# Patient Record
Sex: MALE | Race: WHITE | NOT HISPANIC OR LATINO | Employment: FULL TIME | URBAN - NONMETROPOLITAN AREA
[De-identification: names, ages, dates, MRNs, and addresses within clinical notes are randomized per-mention and may not be internally consistent; named-entity substitution may affect disease eponyms.]

---

## 2023-05-18 ENCOUNTER — OFFICE VISIT (OUTPATIENT)
Dept: FAMILY MEDICINE | Facility: CLINIC | Age: 23
End: 2023-05-18
Payer: COMMERCIAL

## 2023-05-18 VITALS
HEART RATE: 112 BPM | SYSTOLIC BLOOD PRESSURE: 111 MMHG | TEMPERATURE: 98 F | OXYGEN SATURATION: 98 % | DIASTOLIC BLOOD PRESSURE: 80 MMHG | WEIGHT: 140 LBS | RESPIRATION RATE: 14 BRPM | BODY MASS INDEX: 19.6 KG/M2 | HEIGHT: 71 IN

## 2023-05-18 DIAGNOSIS — L70.0 GIANT COMEDONE: Primary | ICD-10-CM

## 2023-05-18 PROCEDURE — 1159F MED LIST DOCD IN RCRD: CPT | Mod: ,,, | Performed by: NURSE PRACTITIONER

## 2023-05-18 PROCEDURE — 3008F PR BODY MASS INDEX (BMI) DOCUMENTED: ICD-10-PCS | Mod: ,,, | Performed by: NURSE PRACTITIONER

## 2023-05-18 PROCEDURE — 3008F BODY MASS INDEX DOCD: CPT | Mod: ,,, | Performed by: NURSE PRACTITIONER

## 2023-05-18 PROCEDURE — 3079F PR MOST RECENT DIASTOLIC BLOOD PRESSURE 80-89 MM HG: ICD-10-PCS | Mod: ,,, | Performed by: NURSE PRACTITIONER

## 2023-05-18 PROCEDURE — 3074F SYST BP LT 130 MM HG: CPT | Mod: ,,, | Performed by: NURSE PRACTITIONER

## 2023-05-18 PROCEDURE — 99203 PR OFFICE/OUTPT VISIT, NEW, LEVL III, 30-44 MIN: ICD-10-PCS | Mod: ,,, | Performed by: NURSE PRACTITIONER

## 2023-05-18 PROCEDURE — 3079F DIAST BP 80-89 MM HG: CPT | Mod: ,,, | Performed by: NURSE PRACTITIONER

## 2023-05-18 PROCEDURE — 1159F PR MEDICATION LIST DOCUMENTED IN MEDICAL RECORD: ICD-10-PCS | Mod: ,,, | Performed by: NURSE PRACTITIONER

## 2023-05-18 PROCEDURE — 99203 OFFICE O/P NEW LOW 30 MIN: CPT | Mod: ,,, | Performed by: NURSE PRACTITIONER

## 2023-05-18 PROCEDURE — 3074F PR MOST RECENT SYSTOLIC BLOOD PRESSURE < 130 MM HG: ICD-10-PCS | Mod: ,,, | Performed by: NURSE PRACTITIONER

## 2023-05-18 RX ORDER — NEOMYCIN SULFATE, POLYMYXIN B SULFATE AND HYDROCORTISONE 10; 3.5; 1 MG/ML; MG/ML; [USP'U]/ML
3 SUSPENSION/ DROPS AURICULAR (OTIC) 3 TIMES DAILY
Qty: 6 ML | Refills: 0 | Status: SHIPPED | OUTPATIENT
Start: 2023-05-18 | End: 2023-05-28

## 2023-05-18 NOTE — PATIENT INSTRUCTIONS
Warm moist compress over left ear to help keep pores open  Begin cortisporin otic drops 3 drops three times daily  Expect drainage after eruption today in clinic for 5 days   Expect complete resolutions within 7 days  After healing, wash ears with soap and water as far as finger will go only  Return for recheck in 5 days

## 2023-05-18 NOTE — PROGRESS NOTES
[facility]    [address] [place] [address]     PATIENT NAME: Kushal Ferguson  : 2000  DATE: 23  MRN: [id number]      Billing Provider: [facility]  Level of Service: MT OFFICE/OUTPT VISIT, SRINATH SOW III, 30-44 MIN      Assessment and Plan (including Health Maintenance)     Problem List Items Addressed This Visit          Derm    Giant comedone - Primary    Current Assessment & Plan     Warm moist compress over left ear to help keep pores open  Begin cortisporin otic drops 3 drops three times daily  Expect drainage after eruption today in clinic for 5 days   Expect complete resolutions within 7 days  After healing, wash ears with soap and water as far as finger will go only  Return for recheck in 5 days            Relevant Medications    neomycin-polymyxin-hydrocortisone (CORTISPORIN) 3.5-10,000-1 mg/mL-unit/mL-% otic suspension       Health Maintenance Topics with due status: Not Due       Topic Last Completion Date    Influenza Vaccine Not Due       No future appointments.   Follow up in about 5 days (around 2023) for recheck-- nurse only visit please.   Health Maintenance Due   Topic Date Due    Hepatitis C Screening  Never done    Lipid Panel  Never done    COVID-19 Vaccine (1) Never done    HPV Vaccines (1 - Male 2-dose series) Never done    HIV Screening  Never done    TETANUS VACCINE  Never done         Reason for Visit / Chief Complaint:   Otalgia (Pt is here with ear pain and swelling on his left side. He states that it has been draining. They do have images from it a few days ago. This issue began last Friday. )     History of Present Illness / Problem Focused Workflow     Kushal Ferguson presents to the clinic with Otalgia (Pt is here with ear pain and swelling on his left side. He states that it has been draining. They do have images from it a few days ago. This issue began last Friday. )     New ear pain since  5/12/2023   Now having difficulty hearing and ear is painful and red and unable to lie on left side    Otalgia   There is pain in the left ear. This is a new problem. The current episode started in the past 7 days. The problem occurs constantly. The problem has been unchanged. There has been no fever. The pain is at a severity of 7/10. The pain is moderate. Associated symptoms include ear discharge. Pertinent negatives include no abdominal pain, coughing, diarrhea, headaches, hearing loss, neck pain, rash, rhinorrhea, sore throat or vomiting. He has tried nothing for the symptoms. The treatment provided no relief. There is no history of a chronic ear infection or hearing loss.     Review of Systems     Review of Systems   Constitutional: Negative.    HENT:  Positive for ear discharge and ear pain. Negative for hearing loss, rhinorrhea and sore throat.    Respiratory:  Negative for cough.    Gastrointestinal:  Negative for abdominal pain, diarrhea and vomiting.   Musculoskeletal:  Negative for neck pain.   Integumentary:  Negative for rash.   Neurological:  Negative for headaches.      Medical / Social / Family History     Past Medical History:   Diagnosis Date    Celiac disease        History reviewed. No pertinent surgical history.    Social History  Kushal Ferguson  reports that he has never smoked. He has never been exposed to tobacco smoke. He has never used smokeless tobacco. He reports that he does not currently use alcohol. He reports that he does not use drugs.    Family History  Kushal Ferguson's family history includes Heart disease in his maternal grandfather; Hypertension in his brother, maternal grandmother, and mother.    Medications and Allergies   Medications  No outpatient medications have been marked as taking for the 5/18/23 encounter (Office Visit) with [facility].       Allergies  Review of patient's allergies indicates:  No Known Allergies    Physical Examination     Vitals:     05/18/23 1434   BP: 111/80   Pulse: (!) 112   Resp: 14   Temp: 98 °F (36.7 °C)    No LMP for male patient.  Physical Exam  Constitutional:       Appearance: He is normal weight.   HENT:      Right Ear: Tympanic membrane and ear canal normal.      Left Ear: Decreased hearing noted. Swelling and tenderness present.      Ears:      Comments: Left canal occluded with large comedome-- erupted with ear wick and pressure  Tolerated with much pain/tenderness  Cardiovascular:      Rate and Rhythm: Regular rhythm. Tachycardia present.      Pulses: Normal pulses.      Heart sounds: Normal heart sounds.   Pulmonary:      Effort: Pulmonary effort is normal.      Breath sounds: Normal breath sounds.   Skin:     General: Skin is warm.   Neurological:      Mental Status: He is alert and oriented to person, place, and time.        LABS:     I have reviewed old labs below:  No results found for: WBC, HGB, HCT, MCV, PLT, NA, K, CL, CALCIUM, PHOS, CO2, GLU, BUN, CREATININE, ANIONGAP, ESTGFRAFRICA, EGFRNONAA, PROT, ALBUMIN, BILITOT, ALKPHOS, ALT, AST, INR, CHOL, TRIG, HDL, LDLCALC, TSH, PSA, GLUF, HGBA1C, MICROALBUR    Signature:  Melani Glass [address] [place] [address]  Phone #: [phone]  Fax #: [phone]       Date of encounter: 5/18/23    Patient Instructions   Warm moist compress over left ear to help keep pores open  Begin cortisporin otic drops 3 drops three times daily  Expect drainage after eruption today in clinic for 5 days   Expect complete resolutions within 7 days  After healing, wash ears with soap and water as far as finger will go only  Return for recheck in 5 days
